# Patient Record
Sex: MALE | Race: WHITE
[De-identification: names, ages, dates, MRNs, and addresses within clinical notes are randomized per-mention and may not be internally consistent; named-entity substitution may affect disease eponyms.]

---

## 2018-03-28 ENCOUNTER — HOSPITAL ENCOUNTER (EMERGENCY)
Dept: HOSPITAL 17 - PHED | Age: 68
Discharge: HOME | End: 2018-03-28
Payer: COMMERCIAL

## 2018-03-28 VITALS
DIASTOLIC BLOOD PRESSURE: 87 MMHG | HEART RATE: 75 BPM | SYSTOLIC BLOOD PRESSURE: 156 MMHG | OXYGEN SATURATION: 99 % | RESPIRATION RATE: 16 BRPM

## 2018-03-28 VITALS
DIASTOLIC BLOOD PRESSURE: 98 MMHG | RESPIRATION RATE: 18 BRPM | OXYGEN SATURATION: 99 % | HEART RATE: 52 BPM | SYSTOLIC BLOOD PRESSURE: 170 MMHG | TEMPERATURE: 98.4 F

## 2018-03-28 VITALS — BODY MASS INDEX: 33.29 KG/M2 | WEIGHT: 273.37 LBS | HEIGHT: 76 IN
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
## 2018-03-28 DIAGNOSIS — Z79.899: ICD-10-CM

## 2018-03-28 DIAGNOSIS — Z88.2: ICD-10-CM

## 2018-03-28 DIAGNOSIS — M47.9: ICD-10-CM

## 2018-03-28 DIAGNOSIS — M19.042: ICD-10-CM

## 2018-03-28 DIAGNOSIS — S16.1XXA: Primary | ICD-10-CM

## 2018-03-28 DIAGNOSIS — I10: ICD-10-CM

## 2018-03-28 DIAGNOSIS — S60.012A: ICD-10-CM

## 2018-03-28 DIAGNOSIS — E11.9: ICD-10-CM

## 2018-03-28 DIAGNOSIS — E78.00: ICD-10-CM

## 2018-03-28 PROCEDURE — 96372 THER/PROPH/DIAG INJ SC/IM: CPT

## 2018-03-28 PROCEDURE — 73140 X-RAY EXAM OF FINGER(S): CPT

## 2018-03-28 PROCEDURE — 72050 X-RAY EXAM NECK SPINE 4/5VWS: CPT

## 2018-03-28 PROCEDURE — 99284 EMERGENCY DEPT VISIT MOD MDM: CPT
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
